# Patient Record
Sex: FEMALE | Race: WHITE | NOT HISPANIC OR LATINO | Employment: OTHER | URBAN - METROPOLITAN AREA
[De-identification: names, ages, dates, MRNs, and addresses within clinical notes are randomized per-mention and may not be internally consistent; named-entity substitution may affect disease eponyms.]

---

## 2018-05-07 ENCOUNTER — ANESTHESIA EVENT (OUTPATIENT)
Dept: SURGERY | Facility: HOSPITAL | Age: 63
Setting detail: HOSPITAL OUTPATIENT SURGERY
End: 2018-05-07

## 2018-05-08 ENCOUNTER — ANESTHESIA (OUTPATIENT)
Dept: SURGERY | Facility: HOSPITAL | Age: 63
Setting detail: HOSPITAL OUTPATIENT SURGERY
End: 2018-05-08

## 2018-05-08 ENCOUNTER — HOSPITAL ENCOUNTER (OUTPATIENT)
Facility: HOSPITAL | Age: 63
Setting detail: HOSPITAL OUTPATIENT SURGERY
Discharge: HOME | End: 2018-05-08
Attending: SURGERY | Admitting: SURGERY

## 2018-05-08 VITALS
TEMPERATURE: 97.6 F | OXYGEN SATURATION: 96 % | WEIGHT: 131 LBS | HEART RATE: 64 BPM | HEIGHT: 64 IN | BODY MASS INDEX: 22.36 KG/M2 | RESPIRATION RATE: 20 BRPM | SYSTOLIC BLOOD PRESSURE: 123 MMHG | DIASTOLIC BLOOD PRESSURE: 70 MMHG

## 2018-05-08 PROBLEM — N62 MACROMASTIA: Status: ACTIVE | Noted: 2018-05-08

## 2018-05-08 PROCEDURE — 63700000 HC SELF-ADMINISTRABLE DRUG: Performed by: SURGERY

## 2018-05-08 PROCEDURE — 71000012 HC PACU PHASE 2 EA ADDL MIN: Performed by: SURGERY

## 2018-05-08 PROCEDURE — 25000000 HC PHARMACY GENERAL: Performed by: NURSE ANESTHETIST, CERTIFIED REGISTERED

## 2018-05-08 PROCEDURE — 71000011 HC PACU PHASE 1 EA ADDL MIN: Performed by: SURGERY

## 2018-05-08 PROCEDURE — 36000003 HC OR LEVEL 3 INITIAL 30MIN: Performed by: SURGERY

## 2018-05-08 PROCEDURE — 25800000 HC PHARMACY IV SOLUTIONS: Performed by: SURGERY

## 2018-05-08 PROCEDURE — 71000002 HC PACU PHASE 2 INITIAL 30MIN: Performed by: SURGERY

## 2018-05-08 PROCEDURE — 25000000 HC PHARMACY GENERAL: Performed by: SURGERY

## 2018-05-08 PROCEDURE — 63600000 HC DRUGS/DETAIL CODE: Mod: JW | Performed by: NURSE ANESTHETIST, CERTIFIED REGISTERED

## 2018-05-08 PROCEDURE — 080QXZZ ALTERATION OF RIGHT LOWER EYELID, EXTERNAL APPROACH: ICD-10-PCS | Performed by: SURGERY

## 2018-05-08 PROCEDURE — 37000001 HC ANESTHESIA GENERAL: Performed by: SURGERY

## 2018-05-08 PROCEDURE — 36000013 HC OR LEVEL 3 EA ADDL MIN: Performed by: SURGERY

## 2018-05-08 PROCEDURE — 63600000 HC DRUGS/DETAIL CODE: Performed by: NURSE ANESTHETIST, CERTIFIED REGISTERED

## 2018-05-08 PROCEDURE — 27200000 HC STERILE SUPPLY: Performed by: SURGERY

## 2018-05-08 PROCEDURE — 63600000 HC DRUGS/DETAIL CODE: Performed by: SURGERY

## 2018-05-08 PROCEDURE — 080RXZZ ALTERATION OF LEFT LOWER EYELID, EXTERNAL APPROACH: ICD-10-PCS | Performed by: SURGERY

## 2018-05-08 PROCEDURE — 71000001 HC PACU PHASE 1 INITIAL 30MIN: Performed by: SURGERY

## 2018-05-08 RX ORDER — ONDANSETRON HYDROCHLORIDE 2 MG/ML
4 INJECTION, SOLUTION INTRAVENOUS EVERY 8 HOURS PRN
Status: DISCONTINUED | OUTPATIENT
Start: 2018-05-08 | End: 2018-05-08 | Stop reason: HOSPADM

## 2018-05-08 RX ORDER — HYDROMORPHONE HYDROCHLORIDE 2 MG/ML
0.5 INJECTION, SOLUTION INTRAMUSCULAR; INTRAVENOUS; SUBCUTANEOUS
Status: DISCONTINUED | OUTPATIENT
Start: 2018-05-08 | End: 2018-05-08 | Stop reason: HOSPADM

## 2018-05-08 RX ORDER — PROPOFOL 10 MG/ML
INJECTION, EMULSION INTRAVENOUS CONTINUOUS PRN
Status: DISCONTINUED | OUTPATIENT
Start: 2018-05-08 | End: 2018-05-08 | Stop reason: SURG

## 2018-05-08 RX ORDER — SCOPOLAMINE 1 MG/3D
1 PATCH, EXTENDED RELEASE TRANSDERMAL
Status: DISCONTINUED | OUTPATIENT
Start: 2018-05-08 | End: 2018-05-08 | Stop reason: HOSPADM

## 2018-05-08 RX ORDER — DEXAMETHASONE SODIUM PHOSPHATE 4 MG/ML
10 INJECTION, SOLUTION INTRA-ARTICULAR; INTRALESIONAL; INTRAMUSCULAR; INTRAVENOUS; SOFT TISSUE ONCE
Status: COMPLETED | OUTPATIENT
Start: 2018-05-08 | End: 2018-05-08

## 2018-05-08 RX ORDER — DEXTROSE 40 %
15-30 GEL (GRAM) ORAL AS NEEDED
Status: DISCONTINUED | OUTPATIENT
Start: 2018-05-08 | End: 2018-05-08 | Stop reason: HOSPADM

## 2018-05-08 RX ORDER — ONDANSETRON HYDROCHLORIDE 2 MG/ML
4 INJECTION, SOLUTION INTRAVENOUS
Status: DISCONTINUED | OUTPATIENT
Start: 2018-05-08 | End: 2018-05-08 | Stop reason: HOSPADM

## 2018-05-08 RX ORDER — MIDAZOLAM HYDROCHLORIDE 2 MG/2ML
INJECTION, SOLUTION INTRAMUSCULAR; INTRAVENOUS AS NEEDED
Status: DISCONTINUED | OUTPATIENT
Start: 2018-05-08 | End: 2018-05-08 | Stop reason: SURG

## 2018-05-08 RX ORDER — EPHEDRINE SULFATE 50 MG/ML
INJECTION, SOLUTION INTRAVENOUS AS NEEDED
Status: DISCONTINUED | OUTPATIENT
Start: 2018-05-08 | End: 2018-05-08 | Stop reason: SURG

## 2018-05-08 RX ORDER — IBUPROFEN 200 MG
16-32 TABLET ORAL AS NEEDED
Status: DISCONTINUED | OUTPATIENT
Start: 2018-05-08 | End: 2018-05-08 | Stop reason: HOSPADM

## 2018-05-08 RX ORDER — ONDANSETRON HYDROCHLORIDE 2 MG/ML
INJECTION, SOLUTION INTRAVENOUS AS NEEDED
Status: DISCONTINUED | OUTPATIENT
Start: 2018-05-08 | End: 2018-05-08 | Stop reason: SURG

## 2018-05-08 RX ORDER — HYDROMORPHONE HYDROCHLORIDE 2 MG/1
2-4 TABLET ORAL EVERY 4 HOURS PRN
Status: DISCONTINUED | OUTPATIENT
Start: 2018-05-08 | End: 2018-05-08 | Stop reason: HOSPADM

## 2018-05-08 RX ORDER — FENTANYL CITRATE 50 UG/ML
50 INJECTION, SOLUTION INTRAMUSCULAR; INTRAVENOUS
Status: DISCONTINUED | OUTPATIENT
Start: 2018-05-08 | End: 2018-05-08 | Stop reason: HOSPADM

## 2018-05-08 RX ORDER — LIDOCAINE HYDROCHLORIDE 10 MG/ML
INJECTION, SOLUTION INFILTRATION; PERINEURAL AS NEEDED
Status: DISCONTINUED | OUTPATIENT
Start: 2018-05-08 | End: 2018-05-08 | Stop reason: SURG

## 2018-05-08 RX ORDER — DIPHENHYDRAMINE HYDROCHLORIDE 50 MG/ML
25 INJECTION INTRAMUSCULAR; INTRAVENOUS EVERY 6 HOURS PRN
Status: DISCONTINUED | OUTPATIENT
Start: 2018-05-08 | End: 2018-05-08 | Stop reason: HOSPADM

## 2018-05-08 RX ORDER — CEFAZOLIN SODIUM/WATER 1 G/10 ML
2 SYRINGE (ML) INTRAVENOUS ONCE
Status: COMPLETED | OUTPATIENT
Start: 2018-05-08 | End: 2018-05-08

## 2018-05-08 RX ORDER — SODIUM CHLORIDE 9 MG/ML
INJECTION, SOLUTION INTRAVENOUS CONTINUOUS
Status: DISCONTINUED | OUTPATIENT
Start: 2018-05-08 | End: 2018-05-08 | Stop reason: HOSPADM

## 2018-05-08 RX ORDER — DEXTROSE 50 % IN WATER (D50W) INTRAVENOUS SYRINGE
25 AS NEEDED
Status: DISCONTINUED | OUTPATIENT
Start: 2018-05-08 | End: 2018-05-08 | Stop reason: HOSPADM

## 2018-05-08 RX ORDER — FENTANYL CITRATE 50 UG/ML
INJECTION, SOLUTION INTRAMUSCULAR; INTRAVENOUS AS NEEDED
Status: DISCONTINUED | OUTPATIENT
Start: 2018-05-08 | End: 2018-05-08 | Stop reason: SURG

## 2018-05-08 RX ORDER — LIDOCAINE HYDROCHLORIDE AND EPINEPHRINE 10; 10 UG/ML; MG/ML
INJECTION, SOLUTION INFILTRATION; PERINEURAL AS NEEDED
Status: DISCONTINUED | OUTPATIENT
Start: 2018-05-08 | End: 2018-05-08 | Stop reason: HOSPADM

## 2018-05-08 RX ORDER — PROPOFOL 10 MG/ML
INJECTION, EMULSION INTRAVENOUS AS NEEDED
Status: DISCONTINUED | OUTPATIENT
Start: 2018-05-08 | End: 2018-05-08 | Stop reason: SURG

## 2018-05-08 RX ORDER — ACETAMINOPHEN 325 MG/1
650 TABLET ORAL
Status: DISCONTINUED | OUTPATIENT
Start: 2018-05-08 | End: 2018-05-08 | Stop reason: HOSPADM

## 2018-05-08 RX ADMIN — SODIUM CHLORIDE: 9 INJECTION, SOLUTION INTRAVENOUS at 06:53

## 2018-05-08 RX ADMIN — FENTANYL CITRATE 12.5 MCG: 50 INJECTION, SOLUTION INTRAMUSCULAR; INTRAVENOUS at 08:57

## 2018-05-08 RX ADMIN — EPHEDRINE SULFATE 5 MG: 50 INJECTION INTRAVENOUS at 08:50

## 2018-05-08 RX ADMIN — FENTANYL CITRATE 12.5 MCG: 50 INJECTION, SOLUTION INTRAMUSCULAR; INTRAVENOUS at 09:12

## 2018-05-08 RX ADMIN — MIDAZOLAM HYDROCHLORIDE 2 MG: 1 INJECTION, SOLUTION INTRAMUSCULAR; INTRAVENOUS at 07:24

## 2018-05-08 RX ADMIN — EPHEDRINE SULFATE 5 MG: 50 INJECTION INTRAVENOUS at 08:00

## 2018-05-08 RX ADMIN — LIDOCAINE HYDROCHLORIDE 8 ML: 10 INJECTION, SOLUTION INFILTRATION; PERINEURAL at 07:31

## 2018-05-08 RX ADMIN — Medication 2 G: at 07:35

## 2018-05-08 RX ADMIN — EPHEDRINE SULFATE 10 MG: 50 INJECTION INTRAVENOUS at 08:11

## 2018-05-08 RX ADMIN — Medication 80 MG: at 07:32

## 2018-05-08 RX ADMIN — EPHEDRINE SULFATE 5 MG: 50 INJECTION INTRAVENOUS at 08:10

## 2018-05-08 RX ADMIN — FENTANYL CITRATE 50 MCG: 50 INJECTION, SOLUTION INTRAMUSCULAR; INTRAVENOUS at 07:31

## 2018-05-08 RX ADMIN — PROPOFOL 25 MCG/KG/MIN: 10 INJECTION, EMULSION INTRAVENOUS at 07:38

## 2018-05-08 RX ADMIN — PROPOFOL 170 MG: 10 INJECTION, EMULSION INTRAVENOUS at 07:31

## 2018-05-08 RX ADMIN — SCOPALAMINE 1 PATCH: 1 PATCH, EXTENDED RELEASE TRANSDERMAL at 06:54

## 2018-05-08 RX ADMIN — ACETAMINOPHEN 650 MG: 325 TABLET, FILM COATED ORAL at 11:00

## 2018-05-08 RX ADMIN — FENTANYL CITRATE 25 MCG: 50 INJECTION, SOLUTION INTRAMUSCULAR; INTRAVENOUS at 07:44

## 2018-05-08 RX ADMIN — DEXAMETHASONE SODIUM PHOSPHATE 10 MG: 4 INJECTION, SOLUTION INTRA-ARTICULAR; INTRALESIONAL; INTRAMUSCULAR; INTRAVENOUS; SOFT TISSUE at 07:43

## 2018-05-08 RX ADMIN — ONDANSETRON 4 MG: 2 INJECTION INTRAMUSCULAR; INTRAVENOUS at 08:52

## 2018-05-08 ASSESSMENT — PAIN - FUNCTIONAL ASSESSMENT: PAIN_FUNCTIONAL_ASSESSMENT: NO/DENIES PAIN

## 2018-05-08 ASSESSMENT — PAIN SCALES - GENERAL: PAIN_LEVEL: 0

## 2018-05-08 NOTE — PERIOPERATIVE NURSING NOTE
Received from Or, AAOx3. Placed on monitors, VSS Report taken from CRNA.  No c/o pain or nausea.  B/L eye incisions intact with iced 4x4s intact.  Right and left lower abdominal bandaids intact

## 2018-05-08 NOTE — ANESTHESIA PREPROCEDURE EVALUATION
Anesthesia ROS/MED HX      ROS/MED HX Comments:    Endo: Bladder cancer      Past Surgical History:   Procedure Laterality Date   • COLONOSCOPY     • CYSTOSCOPY     • TONSILLECTOMY     • TRANSURETHRAL RESECTION OF BLADDER TUMOR         Physical Exam    Airway   Mallampati: III   TM distance: >3 FB   Neck ROM: full  Cardiovascular    Rhythm: regular   Rate: normal  Pulmonary    Decreased breath sounds        Anesthesia Plan    Plan: general    Technique: general endotracheal     Airway: oral intubation   ASA 2  Blood Products:   Use of Blood Products Discussed: No   Anesthetic plan and risks discussed with: patient  Induction:    intravenous   Postop Plan:   Patient Disposition: phase II then home

## 2018-05-08 NOTE — PERIOPERATIVE NURSING NOTE
Pt brought to stage 2 in recliber chair.  No c/o pain or nausea.  Iced 4x4s remian intact on b/l eyes.   bedside

## 2018-05-08 NOTE — OR SURGEON
Pre-Procedure patient identification:  I am the primary operating surgeon/proceduralist and I have identified the patient on 05/08/18 at 7:05am Juan Balderas MD  Phone Number: [phone]

## 2018-05-08 NOTE — PERIOPERATIVE NURSING NOTE
Reviewed d/c instructions with pt and provided her with a written copy of Dr Balderas' eyelid surgery instructions.  Pt and her  verbalize understanding of instructions and deny questions.  Reminded pt to use iced 4x4s on her eyes on the car ride home

## 2018-05-08 NOTE — ANESTHESIA POSTPROCEDURE EVALUATION
Patient: Kristina Guaman    Procedure Summary     Date:  05/08/18 Room / Location:  [facility]    Anesthesia Start:  0726 Anesthesia Stop:  0913    Procedures:       Bilateral Lower lid blepharoplasty fat inj to lower eyelid area bilateral (Bilateral )      FAT GRAFT/INJECTION  SUBPROCEDURE (Bilateral ) Diagnosis:  (Dermatochalasis)    Surgeon:  Juna Balderas MD Responsible Provider:  Shar Gray MD    Anesthesia Type:  general ASA Status:  2          Anesthesia Type: general  PACU Vitals  5/8/2018 0908 - 5/8/2018 1008      5/8/2018 0915 5/8/2018 0926 5/8/2018 0930 5/8/2018 0945    BP: 121/60 121/60 109/60 113/63    Temp: - 36.6 °C (97.8 °F) - -    Pulse: 71 68 62 66    Resp: 20 (!)  22 (!)  8 19    SpO2: 98 % - 97 % 97 %              5/8/2018 1000             BP: 116/69       Temp: -       Pulse: 67       Resp: 14       SpO2: 98 %               Anesthesia Post Evaluation    Pain score: 0  Pain management: satisfactory to patient  Mode of pain management: IV medication  Patient location during evaluation: PACU  Patient participation: complete - patient participated  Level of consciousness: awake and alert  Cardiovascular status: acceptable  Airway Patency: adequate  Respiratory status: acceptable  Hydration status: stable  Anesthetic complications: no

## 2018-05-08 NOTE — BRIEF OP NOTE
Bilateral Lower lid blepharoplasty fat inj to lower eyelid area bilateral (B), FAT GRAFT/INJECTION  SUBPROCEDURE (B) Procedure Note    Procedure:    Bilateral Lower lid blepharoplasty fat inj to lower eyelid area bilateral  CPT(R) Code:  56853 - MS REVISION OF LOWER EYELID    Procedure:    FAT GRAFT/INJECTION  SUBPROCEDURE      * No Diagnosis Codes entered *       * No Diagnosis Codes entered *    Surgeon(s) and Role:     * Juan Balderas MD - Primary    Anesthesia: General    Staff:   Circulator: Nilsa Montilla RN  Scrub Person: Lilian Ngo    Procedure Details   -    Estimated Blood Loss: No blood loss documented.    Specimens:                No specimens collected during this procedure.      Drains:      Implants: * No implants in log *           Complications:  None; patient tolerated the procedure well.           Disposition: PACU - hemodynamically stable.           Condition: stable    Juan Balderas MD  Phone Number: [phone]

## 2018-05-08 NOTE — ANESTHESIA PROCEDURE NOTES
Airway  Urgency: elective    Start Time: 5/8/2018 7:34 AM  Airway not difficult    General Information and Staff    Patient location during procedure: OR  Performed: resident/CRNA     Indications and Patient Condition  Indications for airway management: anesthesia  Sedation level: deep  Preoxygenated: yes  Patient position: sniffing  MILS not maintained throughout  Mask difficulty assessment: 1 - vent by mask    Final Airway Details  Final airway type: endotracheal airway      Successful airway: ETT  Cuffed: yes   Successful intubation technique: direct laryngoscopy  Endotracheal tube insertion site: oral  Blade: Liu  Blade size: #3  ETT size: 7.0 mm  Cormack-Lehane Classification: grade I - full view of glottis  Placement verified by: chest auscultation, capnometry and palpation of cuff   Measured from: lips  ETT to lips (cm): 22  Number of attempts at approach: 1  Number of other approaches attempted: 0  Atraumatic airway insertion

## 2018-05-09 NOTE — OP NOTE
REPORT TYPE:  Operative Note    DATE OF OPERATION:  05/08/2018      PREOPERATIVE DIAGNOSIS:  Bilateral lower lid dermatochalasis.    POSTOPERATIVE DIAGNOSIS:  Bilateral lower lid dermatochalasis.    PROCEDURE:  Bilateral lower lid blepharoplasty.    ATTENDING SURGEON:  Bonnie Schaeffer MD    ANESTHESIA:  General.    ESTIMATED BLOOD LOSS:  Minimal.    DESCRIPTION OF PROCEDURE:  The patient was marked in the preoperative area and taken to the operating room.  The patient was placed supine on the OR table, perioperative antibiotics were given, Venodyne boots were placed and checked for functionality and   general anesthesia initiated.  The face was prepped and draped in standard fashion as were the hips.  The lower eyelids were injected with 1% lidocaine with epinephrine 1:100,000 and through a transconjunctival approach, dissection was carried to the   orbital septum.  The septum was incised and after visualizing and preserving the inferior oblique muscle, medial, central, lateral and lateral most fat pockets were trimmed.  Hemostasis was achieved.  Upon the anesthesiologist's insistence that an   endotracheal tube be used, endotracheal intubation translated into airway stimulation causing the patient to Valsalva.  A lower eyelid hematoma was appreciated and evacuated intraoperatively.  Once the Valsalva was no longer occurring, no further   bleeding was seen.  Laterally, a stab incision was made and dissection carried to the orbital rim.  A 4-0 chromic was used to secure the lateral canthal complex to the inner wall of the lateral orbital rim periosteum.  Fat was harvested from the flanks,   processed on Telfa pads and placed in 1 mL syringes.  After homogenizing the fat, 2.5 mL of fat was placed into each lower periorbital region.  The patient was then cleaned and dressed with ointment, awoken from anesthesia, and taken to the recovery room   uneventfully.      BONNIE SCHAEFFER MD        CC:     DD: 05/09/2018 07:32  DT:  05/09/2018 08:27  Voice ID: [id number]/Report ID: [id number]  [id number]

## 2022-01-21 ENCOUNTER — COMPLETE SKIN EXAM (OUTPATIENT)
Dept: URBAN - METROPOLITAN AREA CLINIC 24 | Facility: CLINIC | Age: 67
Setting detail: DERMATOLOGY
End: 2022-01-21

## 2022-01-21 DIAGNOSIS — C44.311 BASAL CELL CARCINOMA OF SKIN OF NOSE: ICD-10-CM

## 2022-01-21 DIAGNOSIS — C44.319 BASAL CELL CARCINOMA OF SKIN OF OTHER PARTS OF FACE: ICD-10-CM

## 2022-01-21 PROCEDURE — 99214 OFFICE O/P EST MOD 30 MIN: CPT

## 2022-01-21 RX ORDER — CLOBETASOL PROPIONATE 0.25 MG/G
1 A SMALL AMOUNT CREAM TOPICAL TWICE A DAY
Qty: 100 | Refills: 2
Start: 2022-01-21

## 2023-01-24 ENCOUNTER — APPOINTMENT (OUTPATIENT)
Dept: URBAN - METROPOLITAN AREA CLINIC 193 | Age: 68
Setting detail: DERMATOLOGY
End: 2023-01-24

## 2023-01-24 DIAGNOSIS — D22 MELANOCYTIC NEVI: ICD-10-CM

## 2023-01-24 DIAGNOSIS — L57.8 OTHER SKIN CHANGES DUE TO CHRONIC EXPOSURE TO NONIONIZING RADIATION: ICD-10-CM

## 2023-01-24 DIAGNOSIS — L82.1 OTHER SEBORRHEIC KERATOSIS: ICD-10-CM

## 2023-01-24 DIAGNOSIS — L81.4 OTHER MELANIN HYPERPIGMENTATION: ICD-10-CM

## 2023-01-24 PROBLEM — D22.61 MELANOCYTIC NEVI OF RIGHT UPPER LIMB, INCLUDING SHOULDER: Status: ACTIVE | Noted: 2023-01-24

## 2023-01-24 PROCEDURE — OTHER MIPS QUALITY: OTHER

## 2023-01-24 PROCEDURE — OTHER COUNSELING: OTHER

## 2023-01-24 PROCEDURE — 99213 OFFICE O/P EST LOW 20 MIN: CPT

## 2023-01-24 ASSESSMENT — LOCATION ZONE DERM
LOCATION ZONE: LEG
LOCATION ZONE: TRUNK
LOCATION ZONE: ARM
LOCATION ZONE: NECK

## 2023-01-24 ASSESSMENT — LOCATION SIMPLE DESCRIPTION DERM
LOCATION SIMPLE: LEFT PRETIBIAL REGION
LOCATION SIMPLE: LEFT ANTERIOR NECK
LOCATION SIMPLE: CHEST
LOCATION SIMPLE: RIGHT UPPER ARM

## 2023-01-24 ASSESSMENT — LOCATION DETAILED DESCRIPTION DERM
LOCATION DETAILED: LEFT INFERIOR ANTERIOR NECK
LOCATION DETAILED: LEFT PROXIMAL PRETIBIAL REGION
LOCATION DETAILED: LEFT MEDIAL SUPERIOR CHEST
LOCATION DETAILED: RIGHT ANTERIOR PROXIMAL UPPER ARM

## 2023-08-30 ENCOUNTER — APPOINTMENT (OUTPATIENT)
Dept: URBAN - METROPOLITAN AREA CLINIC 193 | Age: 68
Setting detail: DERMATOLOGY
End: 2023-09-04

## 2023-08-30 DIAGNOSIS — L57.0 ACTINIC KERATOSIS: ICD-10-CM

## 2023-08-30 DIAGNOSIS — L72.0 EPIDERMAL CYST: ICD-10-CM

## 2023-08-30 DIAGNOSIS — D18.0 HEMANGIOMA: ICD-10-CM

## 2023-08-30 PROBLEM — D18.01 HEMANGIOMA OF SKIN AND SUBCUTANEOUS TISSUE: Status: ACTIVE | Noted: 2023-08-30

## 2023-08-30 PROCEDURE — OTHER POINTED OUT BY PATIENT: OTHER

## 2023-08-30 PROCEDURE — OTHER COUNSELING: OTHER

## 2023-08-30 PROCEDURE — 99213 OFFICE O/P EST LOW 20 MIN: CPT | Mod: 25

## 2023-08-30 PROCEDURE — 17000 DESTRUCT PREMALG LESION: CPT

## 2023-08-30 PROCEDURE — OTHER PREMALIGNANT DESTRUCTION: OTHER

## 2023-08-30 ASSESSMENT — LOCATION SIMPLE DESCRIPTION DERM
LOCATION SIMPLE: CHEST
LOCATION SIMPLE: NOSE
LOCATION SIMPLE: CHIN

## 2023-08-30 ASSESSMENT — LOCATION DETAILED DESCRIPTION DERM
LOCATION DETAILED: NASAL SUPRATIP
LOCATION DETAILED: NASAL ROOT
LOCATION DETAILED: NASAL TIP
LOCATION DETAILED: RIGHT CHIN
LOCATION DETAILED: LEFT MEDIAL SUPERIOR CHEST
LOCATION DETAILED: RIGHT MEDIAL SUPERIOR CHEST

## 2023-08-30 ASSESSMENT — LOCATION ZONE DERM
LOCATION ZONE: FACE
LOCATION ZONE: NOSE
LOCATION ZONE: TRUNK

## 2023-08-30 NOTE — PROCEDURE: PREMALIGNANT DESTRUCTION
Post-Procedure Care (Optional): Petroleum jelly was applied to the wound.  The patient received detailed post-op instructions.
Detail Level: Detailed
Anesthesia Volume In Cc: 0
Method: electrodesiccation
Consent: The patient's consent was obtained including but not limited to risks of crusting, scabbing, blistering, scarring, darker or lighter pigmentary change, recurrence, incomplete removal and infection.
Render Note In Bullet Format When Appropriate: No
Post-Care Instructions: I reviewed with the patient in detail post-care instructions. Patient is to wear sunprotection, and avoid picking at any of the treated lesions. Pt may apply Vaseline to crusted or scabbing areas.
Render Post-Care In The Note: Yes

## 2024-01-24 ENCOUNTER — APPOINTMENT (OUTPATIENT)
Dept: URBAN - METROPOLITAN AREA CLINIC 193 | Age: 69
Setting detail: DERMATOLOGY
End: 2024-01-29

## 2024-01-24 DIAGNOSIS — L81.4 OTHER MELANIN HYPERPIGMENTATION: ICD-10-CM

## 2024-01-24 DIAGNOSIS — L82.1 OTHER SEBORRHEIC KERATOSIS: ICD-10-CM

## 2024-01-24 DIAGNOSIS — L57.8 OTHER SKIN CHANGES DUE TO CHRONIC EXPOSURE TO NONIONIZING RADIATION: ICD-10-CM

## 2024-01-24 DIAGNOSIS — D22 MELANOCYTIC NEVI: ICD-10-CM

## 2024-01-24 PROBLEM — D22.61 MELANOCYTIC NEVI OF RIGHT UPPER LIMB, INCLUDING SHOULDER: Status: ACTIVE | Noted: 2024-01-24

## 2024-01-24 PROCEDURE — OTHER MIPS QUALITY: OTHER

## 2024-01-24 PROCEDURE — OTHER COUNSELING: OTHER

## 2024-01-24 PROCEDURE — 99213 OFFICE O/P EST LOW 20 MIN: CPT

## 2024-01-24 ASSESSMENT — LOCATION ZONE DERM
LOCATION ZONE: LEG
LOCATION ZONE: NECK
LOCATION ZONE: ARM
LOCATION ZONE: TRUNK

## 2024-01-24 ASSESSMENT — LOCATION DETAILED DESCRIPTION DERM
LOCATION DETAILED: LEFT INFERIOR ANTERIOR NECK
LOCATION DETAILED: LEFT MEDIAL SUPERIOR CHEST
LOCATION DETAILED: LEFT PROXIMAL PRETIBIAL REGION
LOCATION DETAILED: RIGHT ANTERIOR PROXIMAL UPPER ARM

## 2024-01-24 ASSESSMENT — LOCATION SIMPLE DESCRIPTION DERM
LOCATION SIMPLE: RIGHT UPPER ARM
LOCATION SIMPLE: CHEST
LOCATION SIMPLE: LEFT ANTERIOR NECK
LOCATION SIMPLE: LEFT PRETIBIAL REGION

## 2024-02-23 ENCOUNTER — PRE-ADMISSION TESTING (OUTPATIENT)
Dept: PREADMISSION TESTING | Age: 69
End: 2024-02-23

## 2024-02-23 VITALS — BODY MASS INDEX: 20.83 KG/M2 | HEIGHT: 64 IN | WEIGHT: 122 LBS

## 2024-02-23 RX ORDER — ASPIRIN 81 MG/1
81 TABLET ORAL DAILY
COMMUNITY

## 2024-02-23 ASSESSMENT — PAIN SCALES - GENERAL: PAINLEVEL: 0-NO PAIN

## 2024-02-23 NOTE — PRE-PROCEDURE INSTRUCTIONS
1. We will call you between 3 pm and 7 pm on February 26, 2024 to determine that arrival time for your procedure. If you do not hear by 6PM. Please call [phone] for arrival time.     2. Please report to Main Entrance near Parking lot A, walk into main lobby and report to the admission desk on the first floor on the day of your procedure.    3. Please follow the following fasting guidelines:     No solid food EIGHT HOURS prior to arrival time on day of surgery.  Follow DR Balderas' instructions regarding when to stop drinking liquids prior to surgery    4. Please take ONLY the following medications with a sip of water on the morning of your procedure: (populate names and/or NONE)  No NSAIDS, Aspirin, Advil, Aleve, Motrin, Ibuprofen, Herbal supplements or vitamins until after surgery. Tylenol is ok to take    5. Other Instructions: You may brush your teeth the morning of the procedure. Rinse and spit, do not swallow.  Bring a list of your medications with dosages.  Use surgical wash as directed.     6. If you develop a cold, cough, fever, rash, or other symptom prior to the day of the procedure, please report it to your physician immediately.    7. If you need to cancel the procedure for any reason, please contact your physician.    8. Make arrangements to have safe transportation home accompanied by a responsible adult. If you have not arranged safe transportation home, your surgery will be cancelled. Safe transportation may include private vehicle, ride-share service, taxi and public transportation when accompanied by a responsible adult who will assist you home. A responsible adult is someone known to you and does not include the taxi, ride-share or public transit drive transporting you.    9.  If it is medically necessary for you to have a longer stay, you will be informed as soon as the decision is made.    10. Only bring essential items to the hospital.  Do not wear or bring anything of value to the hospital  including jewelry of any kind, money, or wallet. Do not wear make-up or contact lenses.  DO NOT BRING MEDICATIONS FROM HOME unless instructed to do so. DO bring your hearing aids, glasses, and a case    11. No lotion, creams, powders, or oils on skin the morning of procedure     12. Dress in comfortable clothes.    13.  If instructed, please bring a copy of your Advanced Directive (Living Will/Durable Power of ) on the day of your procedure.     14. Ensuring your safety at all times is a very important part of our [facility] Culture of Safety. After having surgery and sedation, you are at risk for falling and balance issues. Although you may feel awake, the effects of the medication can last up to 24 hours after anesthesia. If you need to use the bathroom during your recovery period, nursing staff will escort you there and stay with you to ensure your safety.    15. Refrain from drinking alcohol and smoking cigarettes for 24 hours prior to surgery.    16. Shower with antibacterial soap (Dial) the night before and morning of your procedure.  If your procedure indicates the need for CHG antiseptic wash (Bactoshield or Hibiclens), please use this instead and follow instructions as discussed at the time of your Pre-Admission Testing phone interview or visit.    Above instructions reviewed with patient and patient acknowledges understanding.    Form explained by: Ailyn Campos RN

## 2024-02-26 ENCOUNTER — ANESTHESIA EVENT (OUTPATIENT)
Dept: SURGERY | Facility: HOSPITAL | Age: 69
Setting detail: HOSPITAL OUTPATIENT SURGERY
End: 2024-02-26

## 2024-02-27 ENCOUNTER — ANESTHESIA (OUTPATIENT)
Dept: SURGERY | Facility: HOSPITAL | Age: 69
Setting detail: HOSPITAL OUTPATIENT SURGERY
End: 2024-02-27

## 2024-02-27 ENCOUNTER — HOSPITAL ENCOUNTER (OUTPATIENT)
Facility: HOSPITAL | Age: 69
Setting detail: HOSPITAL OUTPATIENT SURGERY
Discharge: HOME | End: 2024-02-27
Attending: SURGERY | Admitting: SURGERY

## 2024-02-27 VITALS
OXYGEN SATURATION: 95 % | WEIGHT: 123 LBS | HEIGHT: 64 IN | HEART RATE: 80 BPM | DIASTOLIC BLOOD PRESSURE: 72 MMHG | TEMPERATURE: 97.3 F | BODY MASS INDEX: 21 KG/M2 | RESPIRATION RATE: 17 BRPM | SYSTOLIC BLOOD PRESSURE: 122 MMHG

## 2024-02-27 PROCEDURE — 25800000 HC PHARMACY IV SOLUTIONS: Performed by: NURSE ANESTHETIST, CERTIFIED REGISTERED

## 2024-02-27 PROCEDURE — 080RXZZ ALTERATION OF LEFT LOWER EYELID, EXTERNAL APPROACH: ICD-10-PCS | Performed by: SURGERY

## 2024-02-27 PROCEDURE — 0W020JZ ALTERATION OF FACE WITH SYNTHETIC SUBSTITUTE, OPEN APPROACH: ICD-10-PCS | Performed by: SURGERY

## 2024-02-27 PROCEDURE — 63600000 HC DRUGS/DETAIL CODE: Mod: JZ | Performed by: SPECIALIST

## 2024-02-27 PROCEDURE — 36000003 HC OR LEVEL 3 INITIAL 30MIN: Performed by: SURGERY

## 2024-02-27 PROCEDURE — 63700000 HC SELF-ADMINISTRABLE DRUG: Performed by: STUDENT IN AN ORGANIZED HEALTH CARE EDUCATION/TRAINING PROGRAM

## 2024-02-27 PROCEDURE — 25000000 HC PHARMACY GENERAL: Performed by: SURGERY

## 2024-02-27 PROCEDURE — 37000001 HC ANESTHESIA GENERAL: Performed by: SURGERY

## 2024-02-27 PROCEDURE — 0JD83ZZ EXTRACTION OF ABDOMEN SUBCUTANEOUS TISSUE AND FASCIA, PERCUTANEOUS APPROACH: ICD-10-PCS | Performed by: SURGERY

## 2024-02-27 PROCEDURE — 36000013 HC OR LEVEL 3 EA ADDL MIN: Performed by: SURGERY

## 2024-02-27 PROCEDURE — 71000012 HC PACU PHASE 2 EA ADDL MIN: Performed by: SURGERY

## 2024-02-27 PROCEDURE — 27200000 HC STERILE SUPPLY: Performed by: SURGERY

## 2024-02-27 PROCEDURE — 0W020ZZ ALTERATION OF FACE, OPEN APPROACH: ICD-10-PCS | Performed by: SURGERY

## 2024-02-27 PROCEDURE — 63600000 HC DRUGS/DETAIL CODE: Performed by: NURSE ANESTHETIST, CERTIFIED REGISTERED

## 2024-02-27 PROCEDURE — 71000002 HC PACU PHASE 2 INITIAL 30MIN: Performed by: SURGERY

## 2024-02-27 PROCEDURE — 63700000 HC SELF-ADMINISTRABLE DRUG: Performed by: SURGERY

## 2024-02-27 PROCEDURE — 63700000 HC SELF-ADMINISTRABLE DRUG: Performed by: PHYSICIAN ASSISTANT

## 2024-02-27 PROCEDURE — 0W024ZZ ALTERATION OF FACE, PERCUTANEOUS ENDOSCOPIC APPROACH: ICD-10-PCS | Performed by: SURGERY

## 2024-02-27 PROCEDURE — 080QXZZ ALTERATION OF RIGHT LOWER EYELID, EXTERNAL APPROACH: ICD-10-PCS | Performed by: SURGERY

## 2024-02-27 PROCEDURE — 25000000 HC PHARMACY GENERAL: Performed by: PHYSICIAN ASSISTANT

## 2024-02-27 PROCEDURE — 63600000 HC DRUGS/DETAIL CODE: Mod: JZ | Performed by: NURSE ANESTHETIST, CERTIFIED REGISTERED

## 2024-02-27 PROCEDURE — 63600000 HC DRUGS/DETAIL CODE: Performed by: SURGERY

## 2024-02-27 PROCEDURE — 71000011 HC PACU PHASE 1 EA ADDL MIN: Performed by: SURGERY

## 2024-02-27 PROCEDURE — 25000000 HC PHARMACY GENERAL: Performed by: NURSE ANESTHETIST, CERTIFIED REGISTERED

## 2024-02-27 PROCEDURE — 63600000 HC DRUGS/DETAIL CODE: Mod: JW | Performed by: PHYSICIAN ASSISTANT

## 2024-02-27 PROCEDURE — 71000001 HC PACU PHASE 1 INITIAL 30MIN: Performed by: SURGERY

## 2024-02-27 RX ORDER — VALACYCLOVIR HYDROCHLORIDE 500 MG/1
500 TABLET, FILM COATED ORAL 2 TIMES DAILY
COMMUNITY

## 2024-02-27 RX ORDER — SODIUM CHLORIDE 9 MG/ML
INJECTION, SOLUTION INTRAVENOUS CONTINUOUS PRN
Status: DISCONTINUED | OUTPATIENT
Start: 2024-02-27 | End: 2024-02-27 | Stop reason: SURG

## 2024-02-27 RX ORDER — TRANEXAMIC ACID 100 MG/ML
INJECTION, SOLUTION INTRAVENOUS
Status: DISCONTINUED | OUTPATIENT
Start: 2024-02-27 | End: 2024-02-27 | Stop reason: HOSPADM

## 2024-02-27 RX ORDER — DEXTROSE 50 % IN WATER (D50W) INTRAVENOUS SYRINGE
25 AS NEEDED
Status: DISCONTINUED | OUTPATIENT
Start: 2024-02-27 | End: 2024-02-27 | Stop reason: HOSPADM

## 2024-02-27 RX ORDER — NEOMYCIN SULFATE, POLYMYXIN B SULFATE AND DEXAMETHASONE 3.5; 10000; 1 MG/ML; [USP'U]/ML; MG/ML
1 SUSPENSION/ DROPS OPHTHALMIC EVERY 4 HOURS
Status: DISCONTINUED | OUTPATIENT
Start: 2024-02-27 | End: 2024-02-27 | Stop reason: HOSPADM

## 2024-02-27 RX ORDER — ONDANSETRON HYDROCHLORIDE 2 MG/ML
4 INJECTION, SOLUTION INTRAVENOUS EVERY 8 HOURS PRN
Status: DISCONTINUED | OUTPATIENT
Start: 2024-02-27 | End: 2024-02-27 | Stop reason: HOSPADM

## 2024-02-27 RX ORDER — IBUPROFEN 200 MG
16-32 TABLET ORAL AS NEEDED
Status: DISCONTINUED | OUTPATIENT
Start: 2024-02-27 | End: 2024-02-27 | Stop reason: HOSPADM

## 2024-02-27 RX ORDER — MIDAZOLAM HYDROCHLORIDE 2 MG/2ML
INJECTION, SOLUTION INTRAMUSCULAR; INTRAVENOUS AS NEEDED
Status: DISCONTINUED | OUTPATIENT
Start: 2024-02-27 | End: 2024-02-27 | Stop reason: SURG

## 2024-02-27 RX ORDER — FENTANYL CITRATE 50 UG/ML
25 INJECTION, SOLUTION INTRAMUSCULAR; INTRAVENOUS EVERY 5 MIN PRN
Status: DISCONTINUED | OUTPATIENT
Start: 2024-02-27 | End: 2024-02-27 | Stop reason: HOSPADM

## 2024-02-27 RX ORDER — DEXAMETHASONE SODIUM PHOSPHATE 4 MG/ML
10 INJECTION, SOLUTION INTRA-ARTICULAR; INTRALESIONAL; INTRAMUSCULAR; INTRAVENOUS; SOFT TISSUE ONCE
Status: COMPLETED | OUTPATIENT
Start: 2024-02-27 | End: 2024-02-27

## 2024-02-27 RX ORDER — APREPITANT 40 MG/1
40 CAPSULE ORAL DAILY
COMMUNITY

## 2024-02-27 RX ORDER — TRANEXAMIC ACID 10 MG/ML
1000 INJECTION, SOLUTION INTRAVENOUS ONCE
Status: COMPLETED | OUTPATIENT
Start: 2024-02-27 | End: 2024-02-27

## 2024-02-27 RX ORDER — PROPOFOL 10 MG/ML
INJECTION, EMULSION INTRAVENOUS AS NEEDED
Status: DISCONTINUED | OUTPATIENT
Start: 2024-02-27 | End: 2024-02-27 | Stop reason: SURG

## 2024-02-27 RX ORDER — DIAZEPAM 5 MG/1
5 TABLET ORAL EVERY 8 HOURS PRN
Status: DISCONTINUED | OUTPATIENT
Start: 2024-02-27 | End: 2024-02-27 | Stop reason: HOSPADM

## 2024-02-27 RX ORDER — ACETAMINOPHEN 325 MG/1
975 TABLET ORAL EVERY 6 HOURS
Status: DISCONTINUED | OUTPATIENT
Start: 2024-02-27 | End: 2024-02-27 | Stop reason: HOSPADM

## 2024-02-27 RX ORDER — NEOMYCIN SULFATE, POLYMYXIN B SULFATE, AND DEXAMETHASONE 3.5; 10000; 1 MG/G; [USP'U]/G; MG/G
OINTMENT OPHTHALMIC EVERY 6 HOURS
Status: DISCONTINUED | OUTPATIENT
Start: 2024-02-27 | End: 2024-02-27 | Stop reason: HOSPADM

## 2024-02-27 RX ORDER — ONDANSETRON HYDROCHLORIDE 2 MG/ML
INJECTION, SOLUTION INTRAVENOUS AS NEEDED
Status: DISCONTINUED | OUTPATIENT
Start: 2024-02-27 | End: 2024-02-27 | Stop reason: SURG

## 2024-02-27 RX ORDER — POLYETHYLENE GLYCOL 3350 17 G/17G
17 POWDER, FOR SOLUTION ORAL DAILY
Status: DISCONTINUED | OUTPATIENT
Start: 2024-02-27 | End: 2024-02-27 | Stop reason: HOSPADM

## 2024-02-27 RX ORDER — LIDOCAINE HYDROCHLORIDE 10 MG/ML
INJECTION, SOLUTION INFILTRATION; PERINEURAL AS NEEDED
Status: DISCONTINUED | OUTPATIENT
Start: 2024-02-27 | End: 2024-02-27 | Stop reason: SURG

## 2024-02-27 RX ORDER — AMOXICILLIN 250 MG
1 CAPSULE ORAL 2 TIMES DAILY
Status: DISCONTINUED | OUTPATIENT
Start: 2024-02-27 | End: 2024-02-27 | Stop reason: HOSPADM

## 2024-02-27 RX ORDER — SODIUM CHLORIDE, SODIUM GLUCONATE, SODIUM ACETATE, POTASSIUM CHLORIDE AND MAGNESIUM CHLORIDE 30; 37; 368; 526; 502 MG/100ML; MG/100ML; MG/100ML; MG/100ML; MG/100ML
INJECTION, SOLUTION INTRAVENOUS CONTINUOUS PRN
Status: DISCONTINUED | OUTPATIENT
Start: 2024-02-27 | End: 2024-02-27 | Stop reason: SURG

## 2024-02-27 RX ORDER — FENTANYL CITRATE 50 UG/ML
INJECTION, SOLUTION INTRAMUSCULAR; INTRAVENOUS AS NEEDED
Status: DISCONTINUED | OUTPATIENT
Start: 2024-02-27 | End: 2024-02-27 | Stop reason: SURG

## 2024-02-27 RX ORDER — CLONIDINE HYDROCHLORIDE 0.1 MG/1
0.1 TABLET ORAL 2 TIMES DAILY
Status: DISCONTINUED | OUTPATIENT
Start: 2024-02-27 | End: 2024-02-27 | Stop reason: HOSPADM

## 2024-02-27 RX ORDER — OXYCODONE HYDROCHLORIDE 5 MG/1
5 TABLET ORAL EVERY 4 HOURS PRN
Status: DISCONTINUED | OUTPATIENT
Start: 2024-02-27 | End: 2024-02-27 | Stop reason: HOSPADM

## 2024-02-27 RX ORDER — SCOPOLAMINE 1 MG/3D
1 PATCH, EXTENDED RELEASE TRANSDERMAL
Status: DISCONTINUED | OUTPATIENT
Start: 2024-02-27 | End: 2024-02-27 | Stop reason: HOSPADM

## 2024-02-27 RX ORDER — ACETAMINOPHEN 325 MG/1
975 TABLET ORAL ONCE
Status: COMPLETED | OUTPATIENT
Start: 2024-02-27 | End: 2024-02-27

## 2024-02-27 RX ORDER — ONDANSETRON HYDROCHLORIDE 2 MG/ML
4 INJECTION, SOLUTION INTRAVENOUS
Status: DISCONTINUED | OUTPATIENT
Start: 2024-02-27 | End: 2024-02-27 | Stop reason: HOSPADM

## 2024-02-27 RX ORDER — PHENYLEPHRINE HYDROCHLORIDE 10 MG/ML
INJECTION INTRAVENOUS AS NEEDED
Status: DISCONTINUED | OUTPATIENT
Start: 2024-02-27 | End: 2024-02-27 | Stop reason: SURG

## 2024-02-27 RX ORDER — HYDROMORPHONE HYDROCHLORIDE 1 MG/ML
0.25 INJECTION, SOLUTION INTRAMUSCULAR; INTRAVENOUS; SUBCUTANEOUS EVERY 4 HOURS PRN
Status: DISCONTINUED | OUTPATIENT
Start: 2024-02-27 | End: 2024-02-27 | Stop reason: HOSPADM

## 2024-02-27 RX ORDER — PHENYLEPHRINE HCL IN 0.9% NACL 50MG/250ML
0-400 PLASTIC BAG, INJECTION (ML) INTRAVENOUS
Status: DISCONTINUED | OUTPATIENT
Start: 2024-02-27 | End: 2024-02-27 | Stop reason: HOSPADM

## 2024-02-27 RX ORDER — BACITRACIN 500 UNIT/G
OINTMENT (GRAM) TOPICAL
Status: DISCONTINUED | OUTPATIENT
Start: 2024-02-27 | End: 2024-02-27 | Stop reason: HOSPADM

## 2024-02-27 RX ORDER — GABAPENTIN 300 MG/1
300 CAPSULE ORAL 3 TIMES DAILY
Status: DISCONTINUED | OUTPATIENT
Start: 2024-02-27 | End: 2024-02-27 | Stop reason: HOSPADM

## 2024-02-27 RX ORDER — GABAPENTIN 300 MG/1
300 CAPSULE ORAL ONCE
Status: COMPLETED | OUTPATIENT
Start: 2024-02-27 | End: 2024-02-27

## 2024-02-27 RX ORDER — NEOMYCIN SULFATE, POLYMYXIN B SULFATE, AND DEXAMETHASONE 3.5; 10000; 1 MG/G; [USP'U]/G; MG/G
OINTMENT OPHTHALMIC
Status: DISCONTINUED | OUTPATIENT
Start: 2024-02-27 | End: 2024-02-27 | Stop reason: HOSPADM

## 2024-02-27 RX ORDER — DEXTROSE 40 %
15-30 GEL (GRAM) ORAL AS NEEDED
Status: DISCONTINUED | OUTPATIENT
Start: 2024-02-27 | End: 2024-02-27 | Stop reason: HOSPADM

## 2024-02-27 RX ORDER — CLONIDINE HYDROCHLORIDE 0.1 MG/1
0.1 TABLET ORAL EVERY 6 HOURS
Status: DISCONTINUED | OUTPATIENT
Start: 2024-02-27 | End: 2024-02-27 | Stop reason: HOSPADM

## 2024-02-27 RX ORDER — EPHEDRINE SULFATE 50 MG/ML
INJECTION, SOLUTION INTRAVENOUS AS NEEDED
Status: DISCONTINUED | OUTPATIENT
Start: 2024-02-27 | End: 2024-02-27 | Stop reason: SURG

## 2024-02-27 RX ADMIN — SUCCINYLCHOLINE CHLORIDE 100 MG: 20 INJECTION, SOLUTION INTRAMUSCULAR; INTRAVENOUS; PARENTERAL at 07:40

## 2024-02-27 RX ADMIN — PHENYLEPHRINE HYDROCHLORIDE 100 MCG: 10 INJECTION INTRAVENOUS at 10:32

## 2024-02-27 RX ADMIN — TRANEXAMIC ACID 500 MG: 100 INJECTION, SOLUTION INTRAVENOUS at 08:11

## 2024-02-27 RX ADMIN — EPHEDRINE SULFATE 5 MG: 50 INJECTION, SOLUTION INTRAVENOUS at 08:24

## 2024-02-27 RX ADMIN — SODIUM CHLORIDE: 9 INJECTION, SOLUTION INTRAVENOUS at 07:33

## 2024-02-27 RX ADMIN — ACETAMINOPHEN 975 MG: 325 TABLET ORAL at 16:44

## 2024-02-27 RX ADMIN — ACETAMINOPHEN 975 MG: 325 TABLET ORAL at 07:05

## 2024-02-27 RX ADMIN — MIDAZOLAM HYDROCHLORIDE 2 MG: 1 INJECTION, SOLUTION INTRAMUSCULAR; INTRAVENOUS at 07:30

## 2024-02-27 RX ADMIN — LIDOCAINE HYDROCHLORIDE 5 ML: 10 INJECTION, SOLUTION INFILTRATION; PERINEURAL at 07:40

## 2024-02-27 RX ADMIN — PHENYLEPHRINE HYDROCHLORIDE 50 MCG: 10 INJECTION INTRAVENOUS at 08:53

## 2024-02-27 RX ADMIN — GABAPENTIN 300 MG: 300 CAPSULE ORAL at 07:05

## 2024-02-27 RX ADMIN — EPHEDRINE SULFATE 5 MG: 50 INJECTION, SOLUTION INTRAVENOUS at 08:16

## 2024-02-27 RX ADMIN — SCOPOLAMINE 1 PATCH: 1.5 PATCH, EXTENDED RELEASE TRANSDERMAL at 07:06

## 2024-02-27 RX ADMIN — PROPOFOL 200 MG: 10 INJECTION, EMULSION INTRAVENOUS at 07:40

## 2024-02-27 RX ADMIN — PHENYLEPHRINE HYDROCHLORIDE 50 MCG: 10 INJECTION INTRAVENOUS at 08:05

## 2024-02-27 RX ADMIN — TRANEXAMIC ACID 500 MG: 100 INJECTION, SOLUTION INTRAVENOUS at 12:22

## 2024-02-27 RX ADMIN — PHENYLEPHRINE HYDROCHLORIDE 50 MCG: 10 INJECTION INTRAVENOUS at 08:16

## 2024-02-27 RX ADMIN — FENTANYL CITRATE 50 MCG: 50 INJECTION, SOLUTION INTRAMUSCULAR; INTRAVENOUS at 08:15

## 2024-02-27 RX ADMIN — CEFAZOLIN 2 G: 2 INJECTION, POWDER, FOR SOLUTION INTRAMUSCULAR; INTRAVENOUS at 11:38

## 2024-02-27 RX ADMIN — CEFAZOLIN 2 G: 2 INJECTION, POWDER, FOR SOLUTION INTRAMUSCULAR; INTRAVENOUS at 07:40

## 2024-02-27 RX ADMIN — PROPOFOL 80 MCG/KG/MIN: 10 INJECTION, EMULSION INTRAVENOUS at 07:44

## 2024-02-27 RX ADMIN — EPHEDRINE SULFATE 5 MG: 50 INJECTION, SOLUTION INTRAVENOUS at 07:40

## 2024-02-27 RX ADMIN — SODIUM CHLORIDE, SODIUM GLUCONATE, SODIUM ACETATE, POTASSIUM CHLORIDE AND MAGNESIUM CHLORIDE: 526; 502; 368; 37; 30 INJECTION, SOLUTION INTRAVENOUS at 07:40

## 2024-02-27 RX ADMIN — EPHEDRINE SULFATE 5 MG: 50 INJECTION, SOLUTION INTRAVENOUS at 08:05

## 2024-02-27 RX ADMIN — ONDANSETRON HYDROCHLORIDE 4 MG: 2 SOLUTION INTRAMUSCULAR; INTRAVENOUS at 13:02

## 2024-02-27 RX ADMIN — FENTANYL CITRATE 50 MCG: 50 INJECTION, SOLUTION INTRAMUSCULAR; INTRAVENOUS at 07:40

## 2024-02-27 RX ADMIN — DEXAMETHASONE SODIUM PHOSPHATE 10 MG: 4 INJECTION, SOLUTION INTRAMUSCULAR; INTRAVENOUS at 07:40

## 2024-02-27 RX ADMIN — Medication 50 MCG/MIN: at 09:07

## 2024-02-27 NOTE — ANESTHESIA PROCEDURE NOTES
Airway  Urgency: elective    Start Time: 2/27/2024 7:44 AM    General Information and Staff    Patient location during procedure: OR  Performed: resident/CRNA   Performed by: Derrick Walden CRNA  Authorized by: Duglas Ruiz MD      Indications and Patient Condition  Indications for airway management: anesthesia  Sedation level: deep  Preoxygenated: yes  Patient position: sniffing  MILS maintained throughout  Mask difficulty assessment: 1 - vent by mask    Final Airway Details  Final airway type: endotracheal airway      Successful airway: ETT and reinforced tube     Successful intubation technique: direct laryngoscopy  Facilitating devices/methods: intubating stylet  Endotracheal tube insertion site: oral  Blade: Liu  Blade size: #3  ETT size (mm): 7.0  Placement verified by: chest auscultation and capnometry   Measured from: lips  ETT to lips (cm): 22  Number of attempts at approach: 1  Number of other approaches attempted: 0  Atraumatic airway insertion      Additional Comments  Sutured by surgeon

## 2024-02-27 NOTE — OP NOTE
REPORT TYPE: Operative Note     DATE OF OPERATION: @today@    PREOPERATIVE DIAGNOSES:  Bilateral brow ptosis, bilateral lower lid dermatochalasia, facial aging     POSTOPERATIVE DIAGNOSES:  Bilateral brow ptosis, bilateral lower lid dermatochalasia, facial aging     PROCEDURE:  Bilateral endoscopic brow lift and lower lid blepharoplasties, face and neck lift with facial fat grafting, chemical peel     ATTENDING SURGEON:  Juan Balderas    ANESTHESIA:  General.        ESTIMATED BLOOD LOSS:  Minimal.     DESCRIPTION OF PROCEDURE:  The patient was marked in the preoperative area and taken to the operating room.  The patient was placed supine on the OR table.  Perioperative antibiotics were given.  Venodyne boots were placed and checked for functionality and general anesthesia initiated.  The face and abdomen were prepped and draped in standard fashion.  The forehead was injected with local anesthetic and incisions were made 1 cm behind the hairline extending laterally from the temporal fusion plane.  The dissection was carried to the deep temporal fascia.  The dissection was carried inferiorly to release the lateral orbital rim retaining ligaments, being careful to stay just on top of the deep temporal fascia.  The dissection was carried across the temporal fusion plane to a subperiosteal plane medially.  Paramedian incisions were made and an optical cavity created.  Under endoscopic guidance, the periosteum over the orbital rim was released, visualizing and preserving the neurovascular structures during the dissection.  Using a 3 mm bur, a tunnel was created in the anterior table of the frontal bone.  This was performed through the paramedian incisions.  #0 Vicryl suture was placed and secured to the galea and the central forehead was elevated.  Through the lateral incision, 3cc of fat harvested as described below was placed into each temporalis muscle. The SMAS was secured to the deep temporal fascia using 0 Vicryl  suture.  The skin was closed in layers.  The abdomen was injected with local anesthetic and using a Kwok and tulip aspiration cannulas, fat was harvested from the abdmomen and processed in a centrifuge for 3 minutes.  The aqueous and oily layers were  and the fat was placed in 1 mL syringes.  Using a 0.7 mm cannula and using the fat harvested with the tulip cannula, 1.5 mL of fat were placed into each lower periocular region staying below the orbicularis musculature and protecting the globe with digital palpation and 1cc into each A frame deformity.  Using the fat harvested with the Kwok cannula, 3 mL of fat were placed along each mandibular border,  2 mL of fat into each  submalar hollow and 2 mL of fat into the left malar region, 1 mL of fat was placed into each nasolabial fold.  The face was injected with tumescent solution and skin flaps raised.  Through a submental incision the lateral neck dissection was connected.  The  platysma was elevated and a conservative amount of subplatysmal fat was trimmed and a small amount of anterior digastric muscle was shaved tangentially being careful to leave a greater than 50% of the muscle belly.  Laterally, the SMAS and platysma were  elevated using an entry point extending from the lateral canthus to the gonial angle.  The dissection was carried up to and over the zygomaticus major muscle, releasing the zygomatic retaining ligaments while carefully preserving the neurovascular  structures.  The dissection was carried inferiorly to a subplatysmal plane ending medially at the facial vessels and inferiorly approximately 4 cm below the mandible.  The SMAS was secured to the deep temporal and preparotid fascia using 3-0 PDS suture  and through the submental incision, the platysma was plicated centrally using 3-0 Vicryl in an interrupted fashion.  At the level of the hyoid bone, a back cut was performed on either side to breakup platysmal banding.  Laterally, the  platysma was  secured to the lateral mastoid fascia using 2-0 PDS suture.  The remaining SMAS and platysma was tailored using 3-0 Vicryl suture.  Hemostasis was achieved.  Conservative skin excisions were performed.  The face was irrigated with half-strength Betadine  solution and the skin was closed in layers over 15 round Andre drains.  The lower lids were injected with local anesthetic and a subciliary pinch blepharoplasty was performed.  The skin was closed with running 6.0 fast gut. The patient was then cleaned and a 35% TCA using two passes in the perioral region and 1 pass  around the rest of the face, neck, and chest until a light frost was obtained.  The patient was then cleaned and dressed with ointment, sterile gauze, and a head wrap, awoken from anesthesia, and taken to the recovery room uneventfully after removing a preplaced alicea catheter.

## 2024-02-27 NOTE — ANESTHESIA PREPROCEDURE EVALUATION
Relevant Problems   No relevant active problems       Anesthesia ROS/MED HX    Anesthesia History    Previous anesthetics  Cardiovascular   Echocardiogram reviewed and ECG reviewed  ROS/MED HX Comments:    Anesthesia History: Reviewed anesthesia record.       Past Surgical History:   Procedure Laterality Date   • BLEPHAROPLASTY  05/08/2018    Dr Balderas   • COLONOSCOPY     • CYSTOSCOPY     • ESOPHAGOSCOPY / EGD     • TONSILLECTOMY     • TRANSURETHRAL RESECTION OF BLADDER TUMOR         Physical Exam    Airway   Mallampati: II   TM distance: <3 FB   Neck ROM: full  Cardiovascular - normal   Rhythm: regular   Rate: normalPulmonary - normal   clear to auscultation        Anesthesia Plan    Plan: general    Technique: general endotracheal   2 ASA  Anesthetic plan and risks discussed with: patient  Comments:    Plan: Discussed anesthesia in detail. Consent was signed. She is NPO.

## 2024-02-27 NOTE — OR SURGEON
Pre-Procedure patient identification:  I am the primary operating surgeon/proceduralist and I have reviewed the applicable pathology reports and radiology studies for this procedure. I have identified the patient on 02/27/24 at 7:23 AM Juan Balderas MD  Phone Number: [phone]

## 2024-02-27 NOTE — DISCHARGE INSTRUCTIONS
Discharge Instructions  for FaceLift Procedure  dr so morris  iNSTRUCTIONS GIVEN IN OFFICE ARE MORE UP TO DATE AND SHOULD BE FOLLOWED    What should I expect immediately following the procedure?  You should expect to be significantly swollen around the face and eyes and possibly have a moderate amount of bruising.  Mild fevers are very common for the first 2-3 days after your procedure and are a normal part of your body’s recovery.  You should also expect a small amount of clear to red tinged fluid o weep from the incision line for the first one to two days.  For this reason, prepare to sleep using a pillow case or a cloth cover that can be soiled.  Numbness, tingling, and periodic strange sensory changes may occur in the area around your face and ears for the first few weeks.  These sensations are caused by the nerves that are healing during this time.  You will feel most tight in the areas behind the ears and in the neck region.  This sensation improves over time.  You will likely feel tired for the first few days after your procedure.  This is a side effect of anesthesia but your energy level will gradually return over the next few days.    Due to the potential for some discomfort over the first few days, you should not hesitate to take your pain medicine as prescribed, though Tylenol may be all that is necessary.  The prescription medicine can be constipating so let the office know if you notice this side effect, it may be advisable to start taking a stool softener post-operatively (prunes, raisin bran, Colace).  Avoid Ibuprofen or Aspirin based products for the first few days to minimize any risk of bleeding.      Can I resume my normal routine following the procedure?  Your activity level will be gradually increased over a few weeks.   After your procedure, it is important to relax and have a peaceful recovery, but it is also important to walk around multiple times during the day.  Walking up and down stairs  and taking walks around your neighborhood is encouraged even the first day after your procedure.   Your activity that involve strenuous activities will be restricted for two weeks.  You should avoid any activity like aerobics, tennis, or heavy lifting.  You can shower and wash your hair two days after surgery but do so gently and avoid using any hot irons, rollers, or hair dryers.  Use a hand blower on cool settings.   It will be important to avoid any excessive sun exposure during your healing period and to apply the moisturizer containing sun block that has been provided as part of your skin care package.  If you have been given an antibacterial gel to apply to your incisions, do so a few times a day for the first week.  Upon returning to work, we recommend starting with limited hours for the first few days.  After the first 24-48 hours, you may use light camouflage make-up but avoid using it near the incisions.  You may want to have a scarf and sunglasses available to use during your trip home and for your travels for the first week.    Immediately report any of the following signs and symptoms to Dr. Balderas:  Noticeable abrupt increase in pain or swelling  A temperature above 101oF more than three days after the procedure.  Chills that persist or recur repetitively and are unrelieved with clothing and blankets.  Excessive redness or significantly worsening swelling along the incisions.  Increasing drainage from the incision line other than the usual clear to red tinged drainage that may occur for the first 1-2 days.    Is there anything I can do to reduce swelling or bruisng?  Upon completion of your procedure you may have a light bandage wrapping your head and face.   This will be removed the following day.  When work has been done to improve the neck, the head wrap will help keep swelling out of the neck and speed the recovery.  Once the head wrap is removed, you will be put a chin strap for another 24 hours.   After wearing the chin strap for 24 hours, the strap may be removed to shower.  You may wash your hair and your face.  An orange cleaning solution will rinse out of your hair when you shower.  After each shower, apply aquaphor to your incisions and put back the chin strap with one gauze pad under the chin.  No other gauze is necessary. To reduce the amount of swelling and bruising, it is helpful to follow these recommendations for the first 2-3 days:  You do not have to sleep upright, but try to avoid sleeping on your side for the first 48 hours.  Laying back is completely acceptable.  Avoid the use of anything other than a very thin pillow to avoid crunching up the skin of the neck as might occur if the head bends forward with a big pillow.  Neither of these points are essential, however, and it is most important to be comfortable in whatever position you sleep.    Apply cold compresses to your face for 15 minutes at least a few times each hour.    The recommended regimen for the cold compress is 15 minutes on followed by 15-45 minutes off     To make a cold compress, fill a clean basin with ice water and soak washcloths until chilled.  Take out two and apply to the face.  When they are warm, put them back in the ice water and remove another two, going back and forth.  Otherwise, you can use gel compresses that have been chilled in the refrigerator or a bag of chilled peas.        Can I have other facial procedures performed at the same time?  Yes, there are other procedures that can be performed during your facial rejuvenation surgery:  Addition of volume to cheeks  Often, patients look at pictures of themselves from their younger years and notice that they had more facial volume in their cheeks.  When necessary, adding volume back to the face is performed through the repositioning of the natural tissues of the face.  This can be enhanced with the use of fat and stem cell grafting during the procedure.  Skin  "resurfacing (i.e. laser or chemical peel)   If a skin resurfacing procedure is planned, purchase a fresh tub of Aquaphor (by Eucerin and found next to Vaseline in the pharmacy) and fill your prescription for ophthalmic ointment.  Apply Aquaphor to the treated areas everywhere except the eye regions 3-4 times per day to maintain a moist coating over the lasered or peeled regions. Aquaphor is irritating to the eyes so please use the ophthalmic ointment prescribed by Dr. Balderas.   Apply the ophthalmic ointment to the skin around the eye regions 3-4 times per day to keep these areas moist.    Where should I expect to have incisions or scars?  Our newer techniques employ shorter incisions and thereby shorter scars as well as faster recovery times.  The scar is typically hidden in and behind the ear, allowing most patients to wear their hair short or pulled back with no evidence of a scar.  When significant work is necessary on the neck region, a small scar under the chin is used.  Many people have scars in this location from having fallen as a child.  This scar appears no different and is usually forgotten by the patient over time.  Often, there will be small absorbable stitches along the incisions that your body gets rid of rather than absorbing.  This is completely natural and can be removed in the office.     Be patient during the recovery period.  patients often worry if they will ever eventually \"look like myself\".  you should expect to look like a more youthful, balanced version of yourself in a relatively short period of time, so relax and enjoy a few days of rest.        Discharge Instructions  for eyelid Procedure  dr. so balderas  INSTRUCTIONS GIVEN IN OFFICE ARE MORE UP TO DATE AND SHOULD BE FOLLOWED    Will I be in pain following the procedure?  You may have some discomfort for the first few days and should not hesitate to take your pain medicine as prescribed, though Tylenol may be all that is necessary.  " Avoid ibuprofen or aspirin based products for the first few days to minimize any risk of bleeding.      What are the other side effects of the Procedure?  Numbness, tingling, and periodic strange sensory changes may occur in the area around your face and eyes for the first few weeks.  These sensations are caused by the nerves that are healing during this time.  You will likely feel tired for the first few days after your procedure.  This is a side effect of anesthesia but your energy level will gradually return over the next few days.  Your activity will be restricted for one to two weeks.  You should avoid any strenuous activity like aerobics, tennis, or heavy lifting.  Your activity level will then be gradually increased over the next few weeks.  Mild fevers are very common for the first 2-3 days after your procedure and are a normal part of your body’s recovery.      Signs and symptoms to report to Dr. Balderas include:    Noticeable abrupt increase in pain or swelling  A temperature above 100oF more than three days after the procedure.  Chills that persist or recur repetitively and are unrelieved with clothing and blankets.  Excessive redness or significantly worsening swelling along the incisions.  Increasing drainage from the incision line other than the usual clear to red tinged drainage that may occur for the first 1-2 days.    Is there anything I can do to reduce swelling or bruisng?  To reduce the amount of swelling and bruising over the first 2-3 days, it is helpful to follow these recommendations:  Keep your head elevated at all times.    When lying down, use at least 2-3 pillows under your head and upper back.    Apply cold compresses to your face at least a few times each hour.  Fifteen minutes on followed by 15-45 minutes off for the first 1-2 days is one recommended regiment.     To make cold compresses, fill a clean basin with ice water and place clean washcloths in the icewater.  Take out one  washcloth for each eye, wring out excess water, and place over the eyes.  When they warm, put them back in the ice water and take out two more.  Otherwise, you can use gel compresses that have been chilled in the refrigerator or a bag of frozen peas.      What should I be aware of following the procedure?  Bruising and swelling  Upon completion of your eyelid procedure you should expect swelling and bruising around the eyes and upper face for the first few days.  Swelling around the eyes will make the tissues look full and the eyelids look heavy.  Swelling in the eyes can give the appearance of sacks of fluid (known as chemosis) around the whites of the eyes.  Drops and ointments will be given to help prevent and treat this.  Drainage  It is perfectly normal to expect a small amount of clear to red tinged fluid to weep from the incision line for the first 1-2 days.  For this reason, prepare to sleep using pillow cases or a cloth cover that can get dirty.  Stitches  Often, there will be small absorbable stitches along the incisions that your body gets rid of rather than absorbing.  This is completely natural and can be removed in the office.  The incisions will initially look a little red but fade quickly.    Medications  Follow your medication regiment of drops and ointments carefully.  If you have any questions, call the office.  Sunglasses and Sunblock  You may want to have sunglasses available to use during your trip home and for your travels for the first week.    Additionally, it will be important to avoid any excessive sun exposure during your healing period and to apply moisturizer containing sunblock to the area over the incisions.    Laser Resurfacing or Chemical Peel  If you have received a laser resurfacing or chemical peel, do not use make-up until instructed to do so, typically 5-7 days after the procedure.  If a skin resurfacing procedure is planned, purchase a fresh tub of Aquaphor (by Eucerin and  found next to Vaseline in the pharmacy) and fill your prescription for ophthalmic ointment.  Apply Aquaphor to the treated areas everywhere except the eye regions 3-4 times per day to maintain a moist coating over the lasered or peeled regions. Aquaphor is irritating to the eyes so please use the ophthalmic ointment prescribed by Dr. Balderas.   Apply the ophthalmic ointment to the eye regions 3-4 times per day to keep these areas moist.    When Can I resume my normal routine following the procedure?  After your procedure, it is important to relax and have a peaceful recovery, but it is also important to walk around multiple times during the day.  Walking up and down stairs and taking walks around your neighborhood is encouraged even the first day after your procedure.  You may start exercising and full activities two weeks after your procedure.     You can shower and wash your hair and face including your incisions the day after surgery but do so gently and avoid using any hot irons, rollers, or hair dryers.  Use a hand blower on cool settings.  After the first 24-48 hours, you may use light camouflage make-up but avoid using it directly on the incisions until two days after any sutures are removed.        If you have any questions, please do not hesitate to call our office.  We are happy to address any questions at any time and value your feedback.

## 2024-02-27 NOTE — PERIOPERATIVE NURSING NOTE
Called and spoke to patients friend Lubna, gave update on patient status and case start time. Told her Dr Balderas will call when case is over

## 2024-02-27 NOTE — ANESTHESIA POSTPROCEDURE EVALUATION
Patient: Kristina Guaman    Procedure Summary     Date: 02/27/24 Room / Location: [facility]    Anesthesia Start: 0733 Anesthesia Stop: 1359    Procedure: Lower Face and Neck Lift  with fat injections to face, bilateral lower lid blepharoplasty, TCA peel lauren occular area and perioral area, brow lift (Bilateral: Face) Diagnosis:       Facial weakness      Dermatochalasis of left lower eyelid      Dermatochalasis of right lower eyelid      (facial weakness, brow ptosis blepharoptosis  r29.810, h02.835, h02.832)    Surgeons: Juan Balderas MD Responsible Provider: Duglas Ruiz MD    Anesthesia Type: general ASA Status: 2          Anesthesia Type: general  PACU Vitals  2/27/2024 1358 - 2/27/2024 1458      2/27/2024  1401 2/27/2024  1415 2/27/2024  1430 2/27/2024  1445    BP: 118/74 122/76 112/72 113/71    Temp: 36.7 °C (98.1 °F) -- -- --    Pulse: 94 91 83 78    Resp: 19 15 17 18    SpO2: 96 % 96 % 96 % 97 %            Anesthesia Post Evaluation    Patient location during evaluation: PACU  Patient participation: complete - patient participated  Level of consciousness: arousable, responsive to touch and awake  Cardiovascular status: acceptable  Airway Patency: adequate and patent  Respiratory status: nasal cannula, nonlabored ventilation and spontaneous ventilation  Anesthetic complications: no

## 2024-02-27 NOTE — ANESTHESIOLOGIST PRE-PROCEDURE ATTESTATION
Pre-Procedure Patient Identification:  I am the Primary Anesthesiologist and have identified the patient on 02/27/24 at 6:21 AM.   I have confirmed the procedure(s) will be performed by the following surgeon/proceduralist Juan Balderas MD.

## 2025-01-24 ENCOUNTER — APPOINTMENT (OUTPATIENT)
Dept: URBAN - METROPOLITAN AREA CLINIC 193 | Age: 70
Setting detail: DERMATOLOGY
End: 2025-01-27

## 2025-01-24 DIAGNOSIS — L82.1 OTHER SEBORRHEIC KERATOSIS: ICD-10-CM

## 2025-01-24 DIAGNOSIS — Z71.89 OTHER SPECIFIED COUNSELING: ICD-10-CM

## 2025-01-24 DIAGNOSIS — L81.4 OTHER MELANIN HYPERPIGMENTATION: ICD-10-CM

## 2025-01-24 DIAGNOSIS — D22 MELANOCYTIC NEVI: ICD-10-CM

## 2025-01-24 DIAGNOSIS — L57.8 OTHER SKIN CHANGES DUE TO CHRONIC EXPOSURE TO NONIONIZING RADIATION: ICD-10-CM

## 2025-01-24 PROBLEM — D22.61 MELANOCYTIC NEVI OF RIGHT UPPER LIMB, INCLUDING SHOULDER: Status: ACTIVE | Noted: 2025-01-24

## 2025-01-24 PROCEDURE — OTHER COUNSELING: OTHER

## 2025-01-24 PROCEDURE — 99213 OFFICE O/P EST LOW 20 MIN: CPT

## 2025-01-24 PROCEDURE — OTHER MIPS QUALITY: OTHER

## 2025-01-24 ASSESSMENT — LOCATION ZONE DERM
LOCATION ZONE: ARM
LOCATION ZONE: TRUNK
LOCATION ZONE: LEG
LOCATION ZONE: NECK

## 2025-01-24 ASSESSMENT — LOCATION DETAILED DESCRIPTION DERM
LOCATION DETAILED: RIGHT ANTERIOR PROXIMAL UPPER ARM
LOCATION DETAILED: LEFT PROXIMAL PRETIBIAL REGION
LOCATION DETAILED: LEFT INFERIOR ANTERIOR NECK
LOCATION DETAILED: LEFT MEDIAL SUPERIOR CHEST

## 2025-01-24 ASSESSMENT — LOCATION SIMPLE DESCRIPTION DERM
LOCATION SIMPLE: LEFT ANTERIOR NECK
LOCATION SIMPLE: CHEST
LOCATION SIMPLE: LEFT PRETIBIAL REGION
LOCATION SIMPLE: RIGHT UPPER ARM

## 2025-08-08 ENCOUNTER — APPOINTMENT (OUTPATIENT)
Dept: URBAN - METROPOLITAN AREA CLINIC 193 | Age: 70
Setting detail: DERMATOLOGY
End: 2025-08-14

## 2025-08-08 DIAGNOSIS — L57.0 ACTINIC KERATOSIS: ICD-10-CM

## 2025-08-08 DIAGNOSIS — L81.4 OTHER MELANIN HYPERPIGMENTATION: ICD-10-CM

## 2025-08-08 DIAGNOSIS — I78.8 OTHER DISEASES OF CAPILLARIES: ICD-10-CM

## 2025-08-08 PROCEDURE — 17003 DESTRUCT PREMALG LES 2-14: CPT

## 2025-08-08 PROCEDURE — 17000 DESTRUCT PREMALG LESION: CPT

## 2025-08-08 PROCEDURE — OTHER COUNSELING: OTHER

## 2025-08-08 PROCEDURE — 99213 OFFICE O/P EST LOW 20 MIN: CPT | Mod: 25

## 2025-08-08 PROCEDURE — OTHER LIQUID NITROGEN: OTHER

## 2025-08-08 PROCEDURE — OTHER POINTED OUT BY PATIENT: OTHER

## 2025-08-08 ASSESSMENT — LOCATION ZONE DERM
LOCATION ZONE: FACE
LOCATION ZONE: NOSE
LOCATION ZONE: TRUNK

## 2025-08-08 ASSESSMENT — LOCATION SIMPLE DESCRIPTION DERM
LOCATION SIMPLE: RIGHT FOREHEAD
LOCATION SIMPLE: CHEST
LOCATION SIMPLE: LEFT NOSE
LOCATION SIMPLE: RIGHT CHEEK
LOCATION SIMPLE: LEFT CHEEK
LOCATION SIMPLE: NOSE

## 2025-08-08 ASSESSMENT — LOCATION DETAILED DESCRIPTION DERM
LOCATION DETAILED: NASAL ROOT
LOCATION DETAILED: RIGHT MEDIAL FOREHEAD
LOCATION DETAILED: MIDDLE STERNUM
LOCATION DETAILED: RIGHT CENTRAL MALAR CHEEK
LOCATION DETAILED: NASAL DORSUM
LOCATION DETAILED: LEFT CENTRAL MALAR CHEEK
LOCATION DETAILED: LEFT NASAL ALA

## (undated) DEVICE — SYRINGE DISP LUER-LOK  3 CC

## (undated) DEVICE — TIP SUCTION FRAZIER 8FR

## (undated) DEVICE — NEEDLE DISP SPINAL 22GX3-1/2IN

## (undated) DEVICE — SUTURE MONOSOF 5-0 BLACK 1X18 P-12

## (undated) DEVICE — Device

## (undated) DEVICE — COVER LIGHTHANDLE (STERILE SINGLE PA

## (undated) DEVICE — WRAP COBAN LATEX FREE 4IN STERILE

## (undated) DEVICE — SUTURE MONOSOF 4-0 BLACK 1X18 P-12

## (undated) DEVICE — NEEDLE DISP HYPO 30GX1/2IN

## (undated) DEVICE — EVACUATOR CLOSED SUCTION 100C

## (undated) DEVICE — CORD BIPOLAR CODMAN DISPOSABLE 10-CS

## (undated) DEVICE — ***USE 57698*** SLEEVE FLOWTRON DVT CALF SINGLE USE

## (undated) DEVICE — SUTURE MAXON 3-0 GREEN 1X30 GS-21

## (undated) DEVICE — SYRINGE DISP LUER-LOK 10 CC

## (undated) DEVICE — PAD GROUND ELECTROSURGICAL W/CORD

## (undated) DEVICE — SUTURE ETHILON 5-0   1666H

## (undated) DEVICE — APPLICATOR CHLORAPREP 26ML ORANGE TINT

## (undated) DEVICE — COVER LIGHT HANDLE

## (undated) DEVICE — MAGNATRONCE 16INX20IN

## (undated) DEVICE — SUTURE TICRON 2-0 BLUE 1X30 GS-22

## (undated) DEVICE — SUTURE PDS II 3-0 Z338H CT-I 1/2 CIRCLE VIO

## (undated) DEVICE — GLOVE SZ 8 LINER PROTEXIS PI BL

## (undated) DEVICE — TUBING SMOKE EVAC PENCIL COATED

## (undated) DEVICE — SUTURE POLYSORB 4-0 UNDYED 1X18 P-13

## (undated) DEVICE — MANIFOLD SINGLE PORT NEPTUNE

## (undated) DEVICE — SOLN IRRIG .9%SOD 250ML

## (undated) DEVICE — CONNECTOR 5-1 UNSTERILE MEDC

## (undated) DEVICE — SUTURE SOFSILK 0 BLACK 1X18 C-16

## (undated) DEVICE — SEPPS BENZOIN TINCTURE

## (undated) DEVICE — SPONGE RAYTEC 8 X 4 16-PLY

## (undated) DEVICE — SUTURE MAXON 2-0 GREEN 1X30 GS-21

## (undated) DEVICE — TOWEL SURGICAL W17XL27IN BLUE COTTON STANDARD PREWASHED DELI

## (undated) DEVICE — GLOVE SZ 7.5 PROTEXIS CLASSIC LATEX

## (undated) DEVICE — DRESSING ABD STERILE 7X8IN

## (undated) DEVICE — TRAP SPECIMEN INLINE NEPTUNE

## (undated) DEVICE — DRESSING TELFA 3X8

## (undated) DEVICE — BANDAID 4 WING

## (undated) DEVICE — SUTURE PLAIN GUT 6-0   1916G

## (undated) DEVICE — MASK SWISS THERAPY REINFORCED

## (undated) DEVICE — BANDAGE KERLIX STERILE 4.5INX 4.1YDS

## (undated) DEVICE — STAPLER SKIN

## (undated) DEVICE — SPONGE GAUZE 2X2 4 PLY-2FTS

## (undated) DEVICE — SUTURE POLYSORB 3-0 UNDYED 1X30 V-20

## (undated) DEVICE — BLADE SCALPEL #15

## (undated) DEVICE — TIP BOVIE NEEDLE COATED INSULATED

## (undated) DEVICE — SUTURE MONOSOF 6-0 BLACK 1X18 P-13

## (undated) DEVICE — SYRINGE DISP LUER-LOK  1 CC

## (undated) DEVICE — CAP IV/SYR FEMALE

## (undated) DEVICE — SCRIBE SKIN DEVON

## (undated) DEVICE — SOLN BETADINE 4 OZ

## (undated) DEVICE — BURR CARBIDE ROUND 1.8MM

## (undated) DEVICE — GLOVE SZ 7.5 LINER PROTEXIS PI BL

## (undated) DEVICE — SUTURE SOFSILK 0 BLACK 1X30 C-16

## (undated) DEVICE — SYRINGE DISP LUER-LOK 50 CC

## (undated) DEVICE — GLOVE SURG PROTEXIS PF 7.5

## (undated) DEVICE — NEEDLE DISP HYPO 18GX1-1/2IN

## (undated) DEVICE — SUTURE CHROMIC 5-0 GUT UNDYED 1X18 P-13

## (undated) DEVICE — SUTURE CHROMIC GUT  4-0  751G

## (undated) DEVICE — BAG DECANTER

## (undated) DEVICE — DRAPE STERI TOWEL PLASTIC 18X24

## (undated) DEVICE — SUTURE POLYSORB 0 VIOLET 1X30 GU-46

## (undated) DEVICE — NEEDLE DISP HYPO 27GX1-1/4IN

## (undated) DEVICE — BURR ROUND CARBIDE 3.0MM

## (undated) DEVICE — TIP BOVIE BLADE COATED INSULATED 2.75IN

## (undated) DEVICE — BLADE SCALPEL #10

## (undated) DEVICE — SYRINGE DISP LUER-LOK 30 CC

## (undated) DEVICE — TIP SUCTION YANKAUER